# Patient Record
Sex: FEMALE | Race: BLACK OR AFRICAN AMERICAN | Employment: UNEMPLOYED | URBAN - METROPOLITAN AREA
[De-identification: names, ages, dates, MRNs, and addresses within clinical notes are randomized per-mention and may not be internally consistent; named-entity substitution may affect disease eponyms.]

---

## 2019-06-25 ENCOUNTER — TELEPHONE (OUTPATIENT)
Dept: SURGERY | Facility: CLINIC | Age: 64
End: 2019-06-25

## 2019-06-25 NOTE — TELEPHONE ENCOUNTER
----- Message from Margot Reese sent at 6/25/2019  1:21 PM CDT -----  Contact: Natividad- [phone]  Emmanuel- called to get verbal order for pts ostomy supplies- please contact at [phone]

## 2019-07-23 ENCOUNTER — TELEPHONE (OUTPATIENT)
Dept: SURGERY | Facility: CLINIC | Age: 64
End: 2019-07-23

## 2019-07-23 NOTE — TELEPHONE ENCOUNTER
----- Message from Hawa Juarez sent at 7/23/2019  9:53 AM CDT -----  Contact: miguel#[phone] fax#[phone]  Needs Advice    Reason for call:for patient ostomy medical supplies needs verbal orders and document needs to be faxed over.        Communication Preference:    Additional Information:

## 2019-08-05 ENCOUNTER — TELEPHONE (OUTPATIENT)
Dept: SURGERY | Facility: CLINIC | Age: 64
End: 2019-08-05

## 2019-08-05 NOTE — TELEPHONE ENCOUNTER
Spoke with staff of [facility] and asked for a refax to [phone].

## 2019-08-05 NOTE — TELEPHONE ENCOUNTER
----- Message from Maria C Cagle sent at 8/5/2019  9:38 AM CDT -----  Contact: Jl       Reason for call: Calling to get paperwork for ostomy supplies faxed back over 3rd attempt        Communication Preference: fax #[phone] ///  Tel [phone]     Additional Info: Agent Faxing it over again now

## 2019-08-08 ENCOUNTER — TELEPHONE (OUTPATIENT)
Dept: SURGERY | Facility: CLINIC | Age: 64
End: 2019-08-08

## 2019-08-08 NOTE — TELEPHONE ENCOUNTER
Spoke with Sol.  She reports that [place] regulations changed and an MD needs to sign the ostomy supply order.  She will fax the form today.

## 2019-08-08 NOTE — TELEPHONE ENCOUNTER
----- Message from Isabell Petty sent at 8/8/2019  2:13 PM CDT -----  Contact: Sol garcia/ [employer] [phone] ex [phone]  Needs Advice    Reason for call: Sol called stating a MD needs to sign the paper for the pt to received ostomy supplies for insurance purposes        Communication Preference: Sol garcia/ [employer] [phone] ex [phone]    Additional Information:

## 2021-08-25 ENCOUNTER — TELEPHONE (OUTPATIENT)
Dept: WOUND CARE | Facility: CLINIC | Age: 66
End: 2021-08-25

## 2021-08-26 ENCOUNTER — TELEPHONE (OUTPATIENT)
Dept: WOUND CARE | Facility: CLINIC | Age: 66
End: 2021-08-26